# Patient Record
Sex: MALE | Race: OTHER | Employment: FULL TIME | URBAN - METROPOLITAN AREA
[De-identification: names, ages, dates, MRNs, and addresses within clinical notes are randomized per-mention and may not be internally consistent; named-entity substitution may affect disease eponyms.]

---

## 2019-12-04 ENCOUNTER — OFFICE VISIT (OUTPATIENT)
Dept: FAMILY MEDICINE CLINIC | Age: 24
End: 2019-12-04

## 2019-12-04 VITALS
BODY MASS INDEX: 32.9 KG/M2 | RESPIRATION RATE: 18 BRPM | WEIGHT: 235 LBS | TEMPERATURE: 96.9 F | HEART RATE: 61 BPM | OXYGEN SATURATION: 98 % | HEIGHT: 71 IN | DIASTOLIC BLOOD PRESSURE: 70 MMHG | SYSTOLIC BLOOD PRESSURE: 117 MMHG

## 2019-12-04 DIAGNOSIS — H66.92 LEFT OTITIS MEDIA, UNSPECIFIED OTITIS MEDIA TYPE: Primary | ICD-10-CM

## 2019-12-04 DIAGNOSIS — Z96.22 HISTORY OF PLACEMENT OF EAR TUBES: ICD-10-CM

## 2019-12-04 DIAGNOSIS — Z76.89 ENCOUNTER TO ESTABLISH CARE: ICD-10-CM

## 2019-12-04 RX ORDER — OFLOXACIN 3 MG/ML
10 SOLUTION AURICULAR (OTIC) DAILY
Qty: 10 ML | Refills: 0 | Status: SHIPPED | OUTPATIENT
Start: 2019-12-04 | End: 2019-12-18

## 2019-12-04 RX ORDER — IBUPROFEN 200 MG
TABLET ORAL
COMMUNITY

## 2019-12-04 NOTE — PROGRESS NOTES
[address] [facility] Ariel Blum    Office [phone], Fax [phone]    Subjective:     Chief Complaint   Patient presents with   [facility]    Ear Pain     times 4 days. left ear. History provided by patient     HPI:  Nisha Chua is a 21 y.o. OTHER male presents for establish care and ear pain. 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 Establish care    L ear pain  - 2 weeks ago, he had cold symptoms and that has improved. Starting last Sunday (3 days), he has had L ear pain with some drainage. Hearing is mildly reduced. Pt is with hx of b/l eartubes for recurrent OM as teenager. Denies fever/chill, myalgia, sore throat, n/v. +Sick contact. Being using dayquil with minimal relief. Medication reviewed. Allergy reviewed. Fmhx: DM2, HTN (father), no cancer     SocHx. - Denies smoking, social alcohol use, illcit drug use. - Occupation: cut tree     ROS (bolded are positive):   General Negative for fever, chills, changes in weight, changes in appetite   HEENT Negative for hearing loss, earache, congestion, sore throat   CV Negative for chest pain, palpitations, edema   Respiratory Negative for cough, shortness of breath, wheezing   GI Negative for change in bowel habits, abdominal pain, black or bloody stools, nausea or vomiting    Negative for frequency, dysuria, hematuria, penile discharge   MSK Negative for back pain, joint pain, muscle pain   Skin Negative for itching, rash, hives   Neuro Negative for dizziness, headache, confusion, weakness   Psych Negative for anxiety, depression, change in mood     Objective:   Vitals - reviewed  Visit Vitals  /70   Pulse 61   Temp 96.9 °F (36.1 °C) (Oral)   Resp 18   Ht 5' 11\" (1.803 m)   Wt 235 lb (106.6 kg)   SpO2 98%   BMI 32.78 kg/m²        Physical exam:   GEN: NAD. Alert. Well nourished. EYES:  Conjunctiva clear; PERRLA. extraocular movements are intact. EAR: External ears are normal. R ear with old perforated TM. L ear with mild erythema and drainage from perforated TM. Hx of b/l eartubes  NOSE: Turbinates are within normal limits. No drainage  OROPHYARYNX: No oral lesions or exudates. NECK:  Supple; no masses; thyroid normal           LUNGS: Respirations unlabored; CTAB. no wheeze, rales, rhonchi   CARDIOVASCULAR: Regular, rate, and rhythm without murmurs, gallops or rubs   NEUROLOGIC:  No focal neurologic deficits. Strength and sensation grossly intact. EXT: Well perfused. No edema. No erythema. PSYCH: appropriate mood and affect. Good insight and judgement. Cooperative. SKIN: No obvious rashes or lesions. Pertinent Labs/Studies:   - n/a    Assessment and orders:     21yo M w/ hx of recurrent OM and hx of eartube placement here with L OM. Ear is draining with some improvement in quantity. Will treat with ofloxacin. Will have close f/u to assess hearing improvement. No indication to refer to ENT at this point. Precautions given to return. ICD-10-CM ICD-9-CM    1. Left otitis media, unspecified otitis media type H66.92 382.9 ofloxacin (FLOXIN) 0.3 % otic solution   2. History of placement of ear tubes Z96.22 V45.89    3. Encounter to establish care Z76.89 V65.8      Follow-up and Dispositions    · Return in about 2 weeks (around 12/18/2019), or if symptoms worsen or fail to improve, for wellness. Pt was discussed with Dr Jose Obando (attending physician). I have reviewed patient medical and social history and medications. I have reviewed pertinent labs results and other data. I have discussed the diagnosis with the patient and the intended plan as seen in the above orders. The patient has received an after-visit summary and questions were answered concerning future plans. I have discussed medication side effects and warnings with the patient as well.     Michelle Tomas MD  Resident [address] [place] [address]  12/04/19

## 2019-12-04 NOTE — PATIENT INSTRUCTIONS

## 2019-12-04 NOTE — PROGRESS NOTES
Chief Complaint   Patient presents with   [facility] Establish Care    Ear Pain     times 4 days. left ear. 1. Have you been to the ER, urgent care clinic since your last visit? Hospitalized since your last visit? N/A    2. Have you seen or consulted any other health care providers outside of the [address] [place] [address] since your last visit? Include any pap smears or colon screening.  N/A

## 2019-12-17 ENCOUNTER — OFFICE VISIT (OUTPATIENT)
Dept: FAMILY MEDICINE CLINIC | Age: 24
End: 2019-12-17

## 2019-12-17 VITALS
SYSTOLIC BLOOD PRESSURE: 127 MMHG | TEMPERATURE: 98.7 F | DIASTOLIC BLOOD PRESSURE: 62 MMHG | HEIGHT: 71 IN | HEART RATE: 52 BPM | OXYGEN SATURATION: 97 % | WEIGHT: 236 LBS | BODY MASS INDEX: 33.04 KG/M2 | RESPIRATION RATE: 19 BRPM

## 2019-12-17 DIAGNOSIS — H91.92 HEARING LOSS OF LEFT EAR, UNSPECIFIED HEARING LOSS TYPE: ICD-10-CM

## 2019-12-17 DIAGNOSIS — H65.92 OTITIS MEDIA WITH EFFUSION, LEFT: Primary | ICD-10-CM

## 2019-12-17 RX ORDER — CETIRIZINE HCL 10 MG
10 TABLET ORAL DAILY
Qty: 30 TAB | Refills: 1 | Status: SHIPPED | OUTPATIENT
Start: 2019-12-17 | End: 2020-01-08 | Stop reason: SDUPTHER

## 2019-12-17 RX ORDER — FLUTICASONE PROPIONATE 50 MCG
SPRAY, SUSPENSION (ML) NASAL
Qty: 1 BOTTLE | Refills: 1 | Status: SHIPPED | OUTPATIENT
Start: 2019-12-17

## 2019-12-17 NOTE — PATIENT INSTRUCTIONS

## 2019-12-17 NOTE — PROGRESS NOTES
[address] [address]   Office [phone], Fax [phone]    Subjective:     Chief Complaint   Patient presents with    Follow-up     left ear pain; pain has decreased     History provided by patient     HPI:  Nisha Chua is a 21 y.o. OTHER male presents for left OM f/u. 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 L OM f/u   - Pt was diagnosed with L OM that was draining on 12/4/19. He was given ofloxacin. Pt says drainage stopped 2-3 days after but says has not been consistently using the ofloxacin (has some remaining). Says his hearing is worse that last time. Not using any otc medication for his nasal congestion. Pt is with hx of b/l eartubes for recurrent OM as teenager. Denies fever/chill, myalgia, sore throat, n/v.     Medication reviewed. Allergy reviewed. SocHx. - Denies smoking, social alcohol use, illcit drug use. - Occupation: cut tree     ROS (bolded are positive):   General Negative for fever, chills, changes in weight, changes in appetite   HEENT Negative for hearing loss, earache, congestion, sore throat   CV Negative for chest pain, palpitations, edema   Respiratory Negative for cough, shortness of breath, wheezing   Skin Negative for itching, rash, hives   Neuro Negative for dizziness, headache, confusion, weakness   Psych Negative for anxiety, depression, change in mood     Objective:   Vitals - reviewed  Visit Vitals  /62   Pulse (!) 52   Temp 98.7 °F (37.1 °C)   Resp 19   Ht 5' 11\" (1.803 m)   Wt 236 lb (107 kg)   SpO2 97%   BMI 32.92 kg/m²        Physical exam:   GEN: NAD. Alert. Well nourished. EYES:  Conjunctiva clear; PERRLA. extraocular movements are intact. EAR: External ears are normal. L ear with fluid, erythema/bulding improved. Hx of b/l eartubes  NOSE: Turbinates are within normal limits. No drainage  OROPHYARYNX: No oral lesions or exudates.   NECK:  Supple; no masses; thyroid normal           LUNGS: Respirations unlabored; CTAB. no wheeze, rales, rhonchi   CARDIOVASCULAR: Regular, rate, and rhythm without murmurs, gallops or rubs   NEUROLOGIC:  No focal neurologic deficits. Strength and sensation grossly intact. EXT: Well perfused. No edema. No erythema. PSYCH: appropriate mood and affect. Good insight and judgement. Cooperative. SKIN: No obvious rashes or lesions. Pertinent Labs/Studies:   - L hearing test: Unable to hear all frequency on L. Assessment and orders:     21yo M w/ hx of recurrent OM and hx of eartube placement here for f/u for L OM. His ear pain has improved. Drainage has stopped. Exam is improved other than effusion in the ears. Will give flonase and zyrtec to help with symptomatic relief and to help with decreased hearing. Will refer to ENT if not improved in 2 weeks. Precautions given to return. ICD-10-CM ICD-9-CM    1. Otitis media with effusion, left H65.92 381.4 fluticasone propionate (FLONASE) 50 mcg/actuation nasal spray      cetirizine (ZYRTEC) 10 mg tablet      AMB POC AUDIOMETRY (WELL)   2. Hearing loss of left ear, unspecified hearing loss type H91.92 389.9      Follow-up and Dispositions    · Return in about 2 weeks (around 12/31/2019) for Hearing. Pt was discussed with Dr Bladimir Melo (attending physician). I have reviewed patient medical and social history and medications. I have reviewed pertinent labs results and other data. I have discussed the diagnosis with the patient and the intended plan as seen in the above orders. The patient has received an after-visit summary and questions were answered concerning future plans. I have discussed medication side effects and warnings with the patient as well.     Roberto Amaya MD  Resident [address] [facility]  12/17/19

## 2019-12-31 ENCOUNTER — OFFICE VISIT (OUTPATIENT)
Dept: FAMILY MEDICINE CLINIC | Age: 24
End: 2019-12-31

## 2019-12-31 VITALS
TEMPERATURE: 98 F | RESPIRATION RATE: 18 BRPM | SYSTOLIC BLOOD PRESSURE: 105 MMHG | DIASTOLIC BLOOD PRESSURE: 67 MMHG | BODY MASS INDEX: 33.29 KG/M2 | HEIGHT: 71 IN | OXYGEN SATURATION: 100 % | HEART RATE: 58 BPM | WEIGHT: 237.8 LBS

## 2019-12-31 DIAGNOSIS — H66.011 NON-RECURRENT ACUTE SUPPURATIVE OTITIS MEDIA OF RIGHT EAR WITH SPONTANEOUS RUPTURE OF TYMPANIC MEMBRANE: ICD-10-CM

## 2019-12-31 DIAGNOSIS — Z00.01 ENCOUNTER FOR WELL ADULT EXAM WITH ABNORMAL FINDINGS: Primary | ICD-10-CM

## 2019-12-31 DIAGNOSIS — Z98.890 HISTORY OF REMOVAL OF TYMPANOSTOMY TUBE: ICD-10-CM

## 2019-12-31 DIAGNOSIS — Z11.3 ROUTINE SCREENING FOR STI (SEXUALLY TRANSMITTED INFECTION): ICD-10-CM

## 2019-12-31 DIAGNOSIS — H91.91 DECREASED HEARING OF RIGHT EAR: ICD-10-CM

## 2019-12-31 NOTE — PROGRESS NOTES
Chief Complaint   Patient presents with   [address] [place] [address]     1. Have you been to the ER, urgent care clinic since your last visit? Hospitalized since your last visit? No      2. Have you seen or consulted any other health care providers outside of the [address] [place] [address] since your last visit? Include any pap smears or colon screening.  No

## 2019-12-31 NOTE — PATIENT INSTRUCTIONS
1. Decreased hearing of right ear  The patient recently had a ruptured right tympanic membrane, he was treated with ofloxacin eardrops, he did get some improvement however his hearing is not sufficiently improved. He has tried Flonase and Zyrtec without sufficient improvement. It is appropriate at this juncture to refer him to ENT and so I have done this today. He did previously have a history of tubes in his ears which I believe makes him a little bit higher risk in the long-term  - REFERRAL TO ENT-OTOLARYNGOLOGY    2. Routine screening for STI (sexually transmitted infection)  Routine screening is ordered  - CHLAMYDIA/GC PCR; Future  - HIV 1/2 AG/AB, 4TH GENERATION,W RFLX CONFIRM; Future  - RPR; Future    3. Non-recurrent acute suppurative otitis media of right ear with spontaneous rupture of tympanic membrane  Resolved however hearing has not improved, see above  - REFERRAL TO ENT-OTOLARYNGOLOGY    4. History of removal of tympanostomy tube  See above  - REFERRAL TO ENT-OTOLARYNGOLOGY    5. Encounter for well adult exam with abnormal findings  Well adult today, concerns as documented above. I advised him to follow a healthy diet and exercise regularly with a goal of moderate weight loss and improving his cardiovascular fitness level and modifying his cardiovascular risk factors. There is no significant indication for screening labs at his age and given his risk factors. We did discuss this today. Routine STI screening as ordered above.   No concern for exposure or risky behavior  Encouraged him to drink alcohol in moderation if he does drink, continue to abstain from cigarettes and also illicit drugs  I will see him back annually or on an as-needed basis, I have encouraged him to sign up for my chart so he can reach out to us electronically  He will call us if he has any questions or concerns

## 2019-12-31 NOTE — PROGRESS NOTES
Family Medicine Initial Office Visit  Patient: Jessy Rodríguez  1995, 21 y.o., male  Encounter Date: 12/31/2019    ASSESSMENT & PLAN    ICD-10-CM ICD-9-CM    1. Encounter for well adult exam with abnormal findings Z00.01 V70.0    2. Decreased hearing of right ear H91.91 389.9 REFERRAL TO ENT-OTOLARYNGOLOGY   3. Routine screening for STI (sexually transmitted infection) Z11.3 V74.5 CHLAMYDIA/GC PCR      HIV 1/2 AG/AB, 4TH GENERATION,W RFLX CONFIRM      RPR   4. Non-recurrent acute suppurative otitis media of right ear with spontaneous rupture of tympanic membrane H66.011 382.01 REFERRAL TO ENT-OTOLARYNGOLOGY   5. History of removal of tympanostomy tube Z98.890 V45.89 REFERRAL TO ENT-OTOLARYNGOLOGY     Orders Placed This Encounter    CHLAMYDIA/GC PCR     Standing Status:   Future     Standing Expiration Date:   12/31/2020     Order Specific Question:   Sample source     Answer:   Urine [258]     Order Specific Question:   Specimen source     Answer:   Urine [258]    HIV 1/2 AG/AB, 4TH GENERATION,W RFLX CONFIRM     Standing Status:   Future     Standing Expiration Date:   12/31/2020    RPR     Standing Status:   Future     Standing Expiration Date:   12/31/2020   Nicci Rosales ENT ref [facility]     Referral Priority:   Routine     Referral Type:   Consultation     Referral Reason:   Specialty Services Required     Referred to Provider:   Ilsa Sweeney MD     Number of Visits Requested:   1     No problem-specific Assessment & Plan notes found for this encounter. Patient Instructions   1. Decreased hearing of right ear  The patient recently had a ruptured right tympanic membrane, he was treated with ofloxacin eardrops, he did get some improvement however his hearing is not sufficiently improved. He has tried Flonase and Zyrtec without sufficient improvement. It is appropriate at this juncture to refer him to ENT and so I have done this today.   He did previously have a history of tubes in his ears which I believe makes him a little bit higher risk in the long-term  - REFERRAL TO ENT-OTOLARYNGOLOGY    2. Routine screening for STI (sexually transmitted infection)  Routine screening is ordered  - CHLAMYDIA/GC PCR; Future  - HIV 1/2 AG/AB, 4TH GENERATION,W RFLX CONFIRM; Future  - RPR; Future    3. Non-recurrent acute suppurative otitis media of right ear with spontaneous rupture of tympanic membrane  Resolved however hearing has not improved, see above  - REFERRAL TO ENT-OTOLARYNGOLOGY    4. History of removal of tympanostomy tube  See above  - REFERRAL TO ENT-OTOLARYNGOLOGY    5. Encounter for well adult exam with abnormal findings  Well adult today, concerns as documented above. I advised him to follow a healthy diet and exercise regularly with a goal of moderate weight loss and improving his cardiovascular fitness level and modifying his cardiovascular risk factors. There is no significant indication for screening labs at his age and given his risk factors. We did discuss this today. Routine STI screening as ordered above. No concern for exposure or risky behavior  Encouraged him to drink alcohol in moderation if he does drink, continue to abstain from cigarettes and also illicit drugs  I will see him back annually or on an as-needed basis, I have encouraged him to sign up for my chart so he can reach out to us electronically  He will call us if he has any questions or concerns        CHIEF COMPLAINT  Chief Complaint   Patient presents with   Cuate Campos 13 is a 21 y.o. male presenting today for establishing care.    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 Weight is going up a little bit    Tobacco:no  EtOH:occassionally, rarely  Illicit Substances:not presently  Wears a seatbelt in the car  Wears a helmet usually    Sexual Activity:one partner, female, ok with STD screening, uses protection (condoms)    Had flu shot this year    Has been sick this month. He reports he had some symptoms of the flu and it made his ear hurt more than usual. He reports his ears usually hurt when he gets sick and his hearing got blocked up for a while, he reports its partially back now but he reports sometimes he worries he can't hear well. Had a ruptured TM recently    Has been to a hearing doctor when he was younger--he had tubes in his ears in his youth    He has some nasal congestion today and that is botherig him, [facility] Review of Systems  A 12 point review of systems was negative except as noted here or in the HPI. OBJECTIVE  Visit Vitals  /67 (BP 1 Location: Left arm, BP Patient Position: Sitting)   Pulse (!) 58   Temp 98 °F (36.7 °C) (Oral)   Resp 18   Ht 5' 11\" (1.803 m)   Wt 237 lb 12.8 oz (107.9 kg)   SpO2 100%   BMI 33.17 kg/m²       Physical Exam  Vitals signs and nursing note reviewed. Constitutional:       General: He is not in acute distress. Appearance: Normal appearance. He is well-developed. He is not toxic-appearing or diaphoretic. HENT:      Head: Normocephalic and atraumatic. Right Ear: External ear normal.      Left Ear: External ear normal.      Ears:      Comments: Dull left TM, serous effusion right     Nose: Congestion present. Comments: Boggy nasal mucosa     Mouth/Throat:      Mouth: Mucous membranes are moist.      Pharynx: Oropharynx is clear. Posterior oropharyngeal erythema present. No oropharyngeal exudate.       Comments: No exudates or concretions, no tonsillar hypertrophy  Eyes: General: No scleral icterus. Right eye: No discharge. Left eye: No discharge. Extraocular Movements: Extraocular movements intact. Conjunctiva/sclera: Conjunctivae normal.      Pupils: Pupils are equal, round, and reactive to light. Neck:      Musculoskeletal: Normal range of motion and neck supple. Vascular: No carotid bruit. Cardiovascular:      Rate and Rhythm: Normal rate and regular rhythm. Heart sounds: Normal heart sounds. No murmur. No friction rub. No gallop. Pulmonary:      Effort: Pulmonary effort is normal. No respiratory distress. Breath sounds: Normal breath sounds. No stridor. No wheezing, rhonchi or rales. Abdominal:      General: Bowel sounds are normal. There is no distension. Palpations: Abdomen is soft. Tenderness: There is no tenderness. Musculoskeletal:         General: No swelling, tenderness or signs of injury. Right lower leg: No edema. Left lower leg: No edema. Lymphadenopathy:      Cervical: No cervical adenopathy. Skin:     General: Skin is warm and dry. Capillary Refill: Capillary refill takes less than 2 seconds. Findings: No bruising or rash. Neurological:      General: No focal deficit present. Mental Status: He is alert and oriented to person, place, and time. Mental status is at baseline. Gait: Gait normal.   Psychiatric:         Mood and Affect: Mood normal.         Behavior: Behavior normal.         Thought Content: Thought content normal.         Judgment: Judgment normal.         No results found for any visits on 12/31/19. HISTORICAL  Reviewed and updated today, and as noted below:    History reviewed. No pertinent past medical history.   Past Surgical History:   Procedure Laterality Date    HX TONSILLECTOMY Bilateral 2004    HX TYMPANOSTOMY  2004     Family History   Problem Relation Age of Onset    Diabetes Maternal Grandmother      Social History     Tobacco Use   Smoking Status Former Smoker   Smokeless Tobacco Never Used     Social History     Socioeconomic History    Marital status: SINGLE     Spouse name: Not on file    Number of children: Not on file    Years of education: Not on file    Highest education level: Not on file   Tobacco Use    Smoking status: Former Smoker    Smokeless tobacco: Never Used   Substance and Sexual Activity    Alcohol use: Yes    Drug use: Not Currently    Sexual activity: Yes     Partners: Female   Social History Narrative    Working construction     No Known Allergies    No visits with results within 3 Month(s) from this visit. Latest known visit with results is:   Office Visit on 10/25/2013   Component Date Value Ref Range Status    Uric acid 10/25/2013 5.9  3.7 - 8.6 mg/dL Final    Comment:            Therapeutic target for gout patients: <6.0                           Performed At: [address]  [id number]                           [address]                           [phone]         Anabel Rich MD  [facility]  12/31/19 1:22 PM    Portions of this note may have been populated using smart dictation software and may have \"sounds-like\" errors present.

## 2020-01-08 DIAGNOSIS — H65.92 OTITIS MEDIA WITH EFFUSION, LEFT: ICD-10-CM

## 2020-01-08 RX ORDER — CETIRIZINE HCL 10 MG
TABLET ORAL
Qty: 90 TAB | Refills: 3 | Status: SHIPPED | OUTPATIENT
Start: 2020-01-08 | End: 2020-04-07

## 2020-01-13 ENCOUNTER — HOSPITAL ENCOUNTER (OUTPATIENT)
Dept: LAB | Age: 25
Discharge: HOME | End: 2020-01-13

## 2020-01-13 DIAGNOSIS — Z11.3 ROUTINE SCREENING FOR STI (SEXUALLY TRANSMITTED INFECTION): ICD-10-CM

## 2020-01-13 LAB
COMMENT, HOLDF: NORMAL
HIV 1+2 AB+HIV1 P24 AG SERPL QL IA: NONREACTIVE
HIV12 RESULT COMMENT, HHIVC: NORMAL
SAMPLES BEING HELD,HOLD: NORMAL

## 2020-01-14 LAB — RPR SER QL: NONREACTIVE

## 2020-01-15 LAB
C TRACH DNA SPEC QL NAA+PROBE: NEGATIVE
N GONORRHOEA DNA SPEC QL NAA+PROBE: NEGATIVE
SAMPLE TYPE: NORMAL
SERVICE CMNT-IMP: NORMAL
SPECIMEN SOURCE: NORMAL

## 2020-06-01 ENCOUNTER — TELEPHONE (OUTPATIENT)
Dept: FAMILY MEDICINE CLINIC | Age: 25
End: 2020-06-01

## 2020-06-01 NOTE — TELEPHONE ENCOUNTER
----- Message from Frutoso Asp sent at 6/1/2020 11:41 AM EDT -----  Regarding: Dr Karishma Melgoza: [phone]  Appointment not available    Caller's first and last name and relationship to patient (if not the patient):nancy/mother      Best contact [phone]      Preferred date and time:needs a virtual telephone visit for today      Scheduled appointment date and time:      Reason for appointment:bee sting or poison ivy under left eye      Details to clarify the request:      Frutoso Asp

## 2020-06-17 ENCOUNTER — TELEPHONE (OUTPATIENT)
Dept: FAMILY MEDICINE CLINIC | Age: 25
End: 2020-06-17

## 2020-06-19 NOTE — TELEPHONE ENCOUNTER
STD screening tests from January were all negative  Encourage patient to sign up for [facility] so he can access his results virtually

## 2020-06-19 NOTE — TELEPHONE ENCOUNTER
Called and spoke with pt's mom, she is on pt's [de-identified], and she has been advised and states understanding that pt's testing came back negative.

## 2021-01-08 ENCOUNTER — OFFICE VISIT (OUTPATIENT)
Dept: FAMILY MEDICINE CLINIC | Age: 26
End: 2021-01-08
Payer: COMMERCIAL

## 2021-01-08 ENCOUNTER — HOSPITAL ENCOUNTER (OUTPATIENT)
Dept: GENERAL RADIOLOGY | Age: 26
Discharge: HOME | End: 2021-01-08
Payer: COMMERCIAL

## 2021-01-08 VITALS
TEMPERATURE: 97 F | RESPIRATION RATE: 20 BRPM | BODY MASS INDEX: 34.86 KG/M2 | HEIGHT: 71 IN | WEIGHT: 249 LBS | HEART RATE: 61 BPM | SYSTOLIC BLOOD PRESSURE: 135 MMHG | OXYGEN SATURATION: 99 % | DIASTOLIC BLOOD PRESSURE: 85 MMHG

## 2021-01-08 DIAGNOSIS — M54.50 CHRONIC BILATERAL LOW BACK PAIN WITHOUT SCIATICA: Primary | ICD-10-CM

## 2021-01-08 DIAGNOSIS — M54.50 CHRONIC BILATERAL LOW BACK PAIN WITHOUT SCIATICA: ICD-10-CM

## 2021-01-08 DIAGNOSIS — G89.29 CHRONIC BILATERAL LOW BACK PAIN WITHOUT SCIATICA: Primary | ICD-10-CM

## 2021-01-08 DIAGNOSIS — G89.29 CHRONIC BILATERAL LOW BACK PAIN WITHOUT SCIATICA: ICD-10-CM

## 2021-01-08 PROCEDURE — 99213 OFFICE O/P EST LOW 20 MIN: CPT | Performed by: FAMILY MEDICINE

## 2021-01-08 PROCEDURE — 72100 X-RAY EXAM L-S SPINE 2/3 VWS: CPT

## 2021-01-08 NOTE — PROGRESS NOTES
Family Medicine Acute Visit Progress Note  Patient: Yaya Sneed  1995, 31 y.o., male  Encounter Date: 1/8/2021    ASSESSMENT & PLAN    ICD-10-CM ICD-9-CM    1. Chronic bilateral low back pain without sciatica  M54.5 724.2 XR SPINE LUMB 2 OR 3 V    G89.29 338.29        Orders Placed This Encounter    XR SPINE LUMB 2 OR 3 V     Standing Status:   Future     Number of Occurrences:   1     Standing Expiration Date:   2/8/2022       Patient Instructions   Pt requesting xr, got conflicting response from his outside chiro team  Will order  Recommend supportive care unless abnormality (nsaids, rest, weight loss, ice/heat as helps)  Can participate in PT to improve chronic low back pain/posture  No red flags      CHIEF COMPLAINT  Chief Complaint   Patient presents with    Back Pain     pt injuryed back while moving boxes in middle school - pt has seen a ciropacter- states he has a curved back and 2 possible fractures        SUBJECTIVE  Yaya Sneed is a 31 y.o. male presenting today for back pain, worsening over the past 3 m. He's been having to take time off from work or be limited at work. He went to a chiropractor on [facility] and they took some xrays and he has been told that he had \"too much curvature\" in his back. Was told to do several treatments for the next 6 months. He did not want to pursue that because it was going to be more out of pocket than he expected. He went to a second chiro who told him that he needed to be adjusted more frequently and \"Cracked every bone in his body\" and did more to his body from an adjustment standpoint.     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 OBJECTIVE  Visit Vitals  /85   Pulse 61   Temp 97 °F (36.1 °C) (Temporal)   Resp 20   Ht 5' 11\" (1.803 m)   Wt 249 lb (112.9 kg)   SpO2 99%   BMI 34.73 kg/m²       Physical Exam  Vitals signs reviewed. Constitutional:       General: He is not in acute distress. Appearance: Normal appearance. He is obese. He is not ill-appearing, toxic-appearing or diaphoretic. HENT:      Head: Normocephalic and atraumatic. Right Ear: External ear normal.      Left Ear: External ear normal.      Mouth/Throat:      Comments: Mask not removed  Eyes:      General: No scleral icterus. Pulmonary:      Effort: Pulmonary effort is normal. No respiratory distress. Musculoskeletal:         General: No swelling, tenderness, deformity or signs of injury. Right lower leg: No edema. Left lower leg: No edema. Comments: From back and hips  Able to arise from chair without difficulty   Skin:     Capillary Refill: Capillary refill takes less than 2 seconds. Coloration: Skin is not jaundiced or pale. Findings: No bruising, erythema, lesion or rash. Neurological:      General: No focal deficit present. Mental Status: He is alert. Cranial Nerves: No cranial nerve deficit. Gait: Gait normal.   Psychiatric:         Mood and Affect: Mood normal.         Behavior: Behavior normal.         Thought Content: Thought content normal.         Judgment: Judgment normal.         Results for orders placed or performed during the hospital encounter of 01/08/21   XR SPINE LUMB 2 OR 3 V    Narrative    INDICATION:  Chronic bilateral back pain without sciatica. Exam: AP, lateral and cone-down views of the lumbar spine. FINDINGS: There is no acute fracture or subluxation. Intervertebral disc spaces  are well maintained. Bones are well-mineralized.  Soft tissues are normal.      Impression    IMPRESSION: No acute fracture or subluxation. HISTORICAL  PMH, PSH, FHX, SOCHX, ALLERGIES and MEDS were reviewed and updated today. Sheron Bal MD  [facility]  01/08/21 10:05 AM    Portions of this note may have been populated using smart dictation software and may have \"sounds-like\" errors present. Pt was counseled on risks, benefits and alternatives of treatment options. All questions were asked and answered and the patient was agreeable with the treatment plan as outlined.

## 2021-01-08 NOTE — PROGRESS NOTES
Good afternoon  THis xray is a normal back xray, you do not need to be worried about what the chiropractors told you  Take anti inflammatories and if you'd like to participate in physical therapy to improve your symptoms please let me know    Dr Amy Veras

## 2021-01-08 NOTE — PROGRESS NOTES
Chief Complaint   Patient presents with    Back Pain     pt injuryed back while moving boxes in middle school - pt has seen a ciropacter- states he has a curved back and 2 possible fractures

## 2021-01-08 NOTE — PATIENT INSTRUCTIONS
Pt requesting xr, got conflicting response from his outside [facility] team 
Will order Recommend supportive care unless abnormality (nsaids, rest, weight loss, ice/heat as helps) Can participate in PT to improve chronic low back pain/posture No red flags

## 2023-05-20 RX ORDER — FLUTICASONE PROPIONATE 50 MCG
SPRAY, SUSPENSION (ML) NASAL
COMMUNITY
Start: 2019-12-17

## 2023-05-20 RX ORDER — IBUPROFEN 200 MG
TABLET ORAL
COMMUNITY